# Patient Record
Sex: MALE | Race: WHITE | NOT HISPANIC OR LATINO | Employment: UNEMPLOYED | URBAN - NONMETROPOLITAN AREA
[De-identification: names, ages, dates, MRNs, and addresses within clinical notes are randomized per-mention and may not be internally consistent; named-entity substitution may affect disease eponyms.]

---

## 2017-03-21 ENCOUNTER — HOSPITAL ENCOUNTER (EMERGENCY)
Facility: HOSPITAL | Age: 42
Discharge: PSYCHIATRIC OR SUBSTANCE USE FACILITY | End: 2017-03-21
Attending: EMERGENCY MEDICINE | Admitting: EMERGENCY MEDICINE

## 2017-03-21 ENCOUNTER — HOSPITAL ENCOUNTER (INPATIENT)
Facility: HOSPITAL | Age: 42
LOS: 3 days | Discharge: HOME | End: 2017-03-24

## 2017-03-21 VITALS
OXYGEN SATURATION: 99 % | HEIGHT: 67 IN | HEART RATE: 82 BPM | SYSTOLIC BLOOD PRESSURE: 157 MMHG | BODY MASS INDEX: 29.51 KG/M2 | DIASTOLIC BLOOD PRESSURE: 89 MMHG | TEMPERATURE: 97.9 F | RESPIRATION RATE: 18 BRPM | WEIGHT: 188 LBS

## 2017-03-21 DIAGNOSIS — F11.23 OPIOID DEPENDENCE WITH WITHDRAWAL (HCC): Primary | ICD-10-CM

## 2017-03-21 PROBLEM — F32.9 MDD (MAJOR DEPRESSIVE DISORDER): Status: ACTIVE | Noted: 2017-03-21

## 2017-03-21 LAB
ALBUMIN SERPL-MCNC: 4.4 G/DL (ref 3.5–5)
ALBUMIN/GLOB SERPL: 1.5 G/DL (ref 1.5–2.5)
ALP SERPL-CCNC: 67 U/L (ref 40–129)
ALT SERPL W P-5'-P-CCNC: 17 U/L (ref 10–44)
AMPHET+METHAMPHET UR QL: POSITIVE
ANION GAP SERPL CALCULATED.3IONS-SCNC: 4.1 MMOL/L (ref 3.6–11.2)
AST SERPL-CCNC: 17 U/L (ref 10–34)
BARBITURATES UR QL SCN: NEGATIVE
BASOPHILS # BLD AUTO: 0.02 10*3/MM3 (ref 0–0.3)
BASOPHILS NFR BLD AUTO: 0.4 % (ref 0–2)
BENZODIAZ UR QL SCN: POSITIVE
BILIRUB SERPL-MCNC: 0.4 MG/DL (ref 0.2–1.8)
BILIRUB UR QL STRIP: NEGATIVE
BUN BLD-MCNC: 15 MG/DL (ref 7–21)
BUN/CREAT SERPL: 19 (ref 7–25)
BUPRENORPHINE+NOR UR QL SCN: POSITIVE
CALCIUM SPEC-SCNC: 9.5 MG/DL (ref 7.7–10)
CANNABINOIDS SERPL QL: NEGATIVE
CHLORIDE SERPL-SCNC: 109 MMOL/L (ref 99–112)
CLARITY UR: CLEAR
CO2 SERPL-SCNC: 30.9 MMOL/L (ref 24.3–31.9)
COCAINE UR QL: NEGATIVE
COLOR UR: YELLOW
CREAT BLD-MCNC: 0.79 MG/DL (ref 0.43–1.29)
DEPRECATED RDW RBC AUTO: 42.4 FL (ref 37–54)
EOSINOPHIL # BLD AUTO: 0.25 10*3/MM3 (ref 0–0.7)
EOSINOPHIL NFR BLD AUTO: 5.2 % (ref 0–5)
ERYTHROCYTE [DISTWIDTH] IN BLOOD BY AUTOMATED COUNT: 13.3 % (ref 11.5–14.5)
ETHANOL BLD-MCNC: <10 MG/DL
ETHANOL UR QL: <0.01 %
GFR SERPL CREATININE-BSD FRML MDRD: 107 ML/MIN/1.73
GLOBULIN UR ELPH-MCNC: 2.9 GM/DL
GLUCOSE BLD-MCNC: 98 MG/DL (ref 70–110)
GLUCOSE UR STRIP-MCNC: NEGATIVE MG/DL
HAV IGM SERPL QL IA: ABNORMAL
HBV CORE IGM SERPL QL IA: ABNORMAL
HBV SURFACE AG SERPL QL IA: ABNORMAL
HCT VFR BLD AUTO: 41.6 % (ref 42–52)
HCV AB SER DONR QL: REACTIVE
HGB BLD-MCNC: 13.6 G/DL (ref 14–18)
HGB UR QL STRIP.AUTO: NEGATIVE
HIV1+2 AB SER QL: NORMAL
IMM GRANULOCYTES # BLD: 0.01 10*3/MM3 (ref 0–0.03)
IMM GRANULOCYTES NFR BLD: 0.2 % (ref 0–0.5)
KETONES UR QL STRIP: NEGATIVE
LEUKOCYTE ESTERASE UR QL STRIP.AUTO: NEGATIVE
LYMPHOCYTES # BLD AUTO: 1.58 10*3/MM3 (ref 1–3)
LYMPHOCYTES NFR BLD AUTO: 32.6 % (ref 21–51)
MAGNESIUM SERPL-MCNC: 1.8 MG/DL (ref 1.7–2.6)
MCH RBC QN AUTO: 28.5 PG (ref 27–33)
MCHC RBC AUTO-ENTMCNC: 32.7 G/DL (ref 33–37)
MCV RBC AUTO: 87.2 FL (ref 80–94)
METHADONE UR QL SCN: NEGATIVE
MONOCYTES # BLD AUTO: 0.51 10*3/MM3 (ref 0.1–0.9)
MONOCYTES NFR BLD AUTO: 10.5 % (ref 0–10)
NEUTROPHILS # BLD AUTO: 2.47 10*3/MM3 (ref 1.4–6.5)
NEUTROPHILS NFR BLD AUTO: 51.1 % (ref 30–70)
NITRITE UR QL STRIP: NEGATIVE
OPIATES UR QL: NEGATIVE
OSMOLALITY SERPL CALC.SUM OF ELEC: 287.6 MOSM/KG (ref 273–305)
OXYCODONE UR QL SCN: NEGATIVE
PCP UR QL SCN: NEGATIVE
PH UR STRIP.AUTO: <=5 [PH] (ref 5–8)
PLATELET # BLD AUTO: 169 10*3/MM3 (ref 130–400)
PMV BLD AUTO: 8.8 FL (ref 6–10)
POTASSIUM BLD-SCNC: 4.2 MMOL/L (ref 3.5–5.3)
PROPOXYPH UR QL: NEGATIVE
PROT SERPL-MCNC: 7.3 G/DL (ref 6–8)
PROT UR QL STRIP: NEGATIVE
RBC # BLD AUTO: 4.77 10*6/MM3 (ref 4.7–6.1)
SODIUM BLD-SCNC: 144 MMOL/L (ref 135–153)
SP GR UR STRIP: 1.02 (ref 1–1.03)
UROBILINOGEN UR QL STRIP: NORMAL
WBC NRBC COR # BLD: 4.84 10*3/MM3 (ref 4.5–12.5)

## 2017-03-21 PROCEDURE — 80074 ACUTE HEPATITIS PANEL: CPT

## 2017-03-21 PROCEDURE — HZ2ZZZZ DETOXIFICATION SERVICES FOR SUBSTANCE ABUSE TREATMENT: ICD-10-PCS

## 2017-03-21 PROCEDURE — G0432 EIA HIV-1/HIV-2 SCREEN: HCPCS

## 2017-03-21 RX ORDER — CLONIDINE HYDROCHLORIDE 0.1 MG/1
0.1 TABLET ORAL ONCE AS NEEDED
Status: CANCELLED | OUTPATIENT
Start: 2017-03-25 | End: 2017-03-26

## 2017-03-21 RX ORDER — CLONIDINE HYDROCHLORIDE 0.1 MG/1
0.1 TABLET ORAL 4 TIMES DAILY PRN
Status: ACTIVE | OUTPATIENT
Start: 2017-03-21 | End: 2017-03-22

## 2017-03-21 RX ORDER — MULTIVITAMIN
1 TABLET ORAL DAILY
Status: CANCELLED | OUTPATIENT
Start: 2017-03-21

## 2017-03-21 RX ORDER — BENZTROPINE MESYLATE 1 MG/1
1 TABLET ORAL DAILY PRN
Status: CANCELLED | OUTPATIENT
Start: 2017-03-21

## 2017-03-21 RX ORDER — ONDANSETRON 4 MG/1
4 TABLET, FILM COATED ORAL 3 TIMES DAILY PRN
Status: DISCONTINUED | OUTPATIENT
Start: 2017-03-21 | End: 2017-03-24 | Stop reason: HOSPADM

## 2017-03-21 RX ORDER — MULTIVITAMIN
1 TABLET ORAL DAILY
Status: DISCONTINUED | OUTPATIENT
Start: 2017-03-21 | End: 2017-03-24 | Stop reason: HOSPADM

## 2017-03-21 RX ORDER — BENZONATATE 100 MG/1
100 CAPSULE ORAL 3 TIMES DAILY PRN
Status: CANCELLED | OUTPATIENT
Start: 2017-03-21

## 2017-03-21 RX ORDER — CLONIDINE HYDROCHLORIDE 0.1 MG/1
0.1 TABLET ORAL ONCE AS NEEDED
Status: DISCONTINUED | OUTPATIENT
Start: 2017-03-25 | End: 2017-03-24 | Stop reason: HOSPADM

## 2017-03-21 RX ORDER — CLONIDINE HYDROCHLORIDE 0.1 MG/1
0.1 TABLET ORAL 4 TIMES DAILY PRN
Status: ACTIVE | OUTPATIENT
Start: 2017-03-22 | End: 2017-03-23

## 2017-03-21 RX ORDER — DICYCLOMINE HYDROCHLORIDE 10 MG/1
10 CAPSULE ORAL 3 TIMES DAILY PRN
Status: CANCELLED | OUTPATIENT
Start: 2017-03-21 | End: 2017-03-26

## 2017-03-21 RX ORDER — ECHINACEA PURPUREA EXTRACT 125 MG
2 TABLET ORAL AS NEEDED
Status: CANCELLED | OUTPATIENT
Start: 2017-03-21

## 2017-03-21 RX ORDER — NICOTINE 21 MG/24HR
1 PATCH, TRANSDERMAL 24 HOURS TRANSDERMAL EVERY 24 HOURS
Status: CANCELLED | OUTPATIENT
Start: 2017-03-21

## 2017-03-21 RX ORDER — ONDANSETRON 4 MG/1
4 TABLET, FILM COATED ORAL 3 TIMES DAILY PRN
Status: CANCELLED | OUTPATIENT
Start: 2017-03-21 | End: 2017-03-26

## 2017-03-21 RX ORDER — TRAZODONE HYDROCHLORIDE 50 MG/1
50 TABLET ORAL NIGHTLY PRN
Status: CANCELLED | OUTPATIENT
Start: 2017-03-21

## 2017-03-21 RX ORDER — CYCLOBENZAPRINE HCL 10 MG
10 TABLET ORAL 3 TIMES DAILY PRN
Status: CANCELLED | OUTPATIENT
Start: 2017-03-21 | End: 2017-03-26

## 2017-03-21 RX ORDER — CLONIDINE HYDROCHLORIDE 0.1 MG/1
0.1 TABLET ORAL 2 TIMES DAILY PRN
Status: CANCELLED | OUTPATIENT
Start: 2017-03-24 | End: 2017-03-25

## 2017-03-21 RX ORDER — PANTOPRAZOLE SODIUM 40 MG/1
40 TABLET, DELAYED RELEASE ORAL
Status: DISCONTINUED | OUTPATIENT
Start: 2017-03-22 | End: 2017-03-24 | Stop reason: HOSPADM

## 2017-03-21 RX ORDER — OMEPRAZOLE 40 MG/1
40 CAPSULE, DELAYED RELEASE ORAL DAILY
Status: ON HOLD | COMMUNITY
End: 2017-03-24

## 2017-03-21 RX ORDER — MAGNESIUM HYDROXIDE/ALUMINUM HYDROXICE/SIMETHICONE 120; 1200; 1200 MG/30ML; MG/30ML; MG/30ML
30 SUSPENSION ORAL EVERY 6 HOURS PRN
Status: CANCELLED | OUTPATIENT
Start: 2017-03-21

## 2017-03-21 RX ORDER — HYDROXYZINE 50 MG/1
50 TABLET, FILM COATED ORAL EVERY 6 HOURS PRN
Status: DISCONTINUED | OUTPATIENT
Start: 2017-03-21 | End: 2017-03-24 | Stop reason: HOSPADM

## 2017-03-21 RX ORDER — ALUMINA, MAGNESIA, AND SIMETHICONE 2400; 2400; 240 MG/30ML; MG/30ML; MG/30ML
15 SUSPENSION ORAL EVERY 6 HOURS PRN
Status: DISCONTINUED | OUTPATIENT
Start: 2017-03-21 | End: 2017-03-24 | Stop reason: HOSPADM

## 2017-03-21 RX ORDER — CLONIDINE HYDROCHLORIDE 0.1 MG/1
0.1 TABLET ORAL 3 TIMES DAILY PRN
Status: CANCELLED | OUTPATIENT
Start: 2017-03-23 | End: 2017-03-24

## 2017-03-21 RX ORDER — GABAPENTIN 300 MG/1
600 CAPSULE ORAL EVERY 8 HOURS SCHEDULED
Status: CANCELLED | OUTPATIENT
Start: 2017-03-21

## 2017-03-21 RX ORDER — FAMOTIDINE 20 MG/1
20 TABLET, FILM COATED ORAL 2 TIMES DAILY PRN
Status: CANCELLED | OUTPATIENT
Start: 2017-03-21

## 2017-03-21 RX ORDER — TRAZODONE HYDROCHLORIDE 50 MG/1
50 TABLET ORAL NIGHTLY PRN
Status: DISCONTINUED | OUTPATIENT
Start: 2017-03-21 | End: 2017-03-24 | Stop reason: HOSPADM

## 2017-03-21 RX ORDER — LOPERAMIDE HYDROCHLORIDE 2 MG/1
2 CAPSULE ORAL 4 TIMES DAILY PRN
Status: DISCONTINUED | OUTPATIENT
Start: 2017-03-21 | End: 2017-03-24 | Stop reason: HOSPADM

## 2017-03-21 RX ORDER — BENZTROPINE MESYLATE 1 MG/1
1 TABLET ORAL DAILY PRN
Status: DISCONTINUED | OUTPATIENT
Start: 2017-03-21 | End: 2017-03-24 | Stop reason: HOSPADM

## 2017-03-21 RX ORDER — CLONIDINE HYDROCHLORIDE 0.1 MG/1
0.1 TABLET ORAL 2 TIMES DAILY PRN
Status: DISCONTINUED | OUTPATIENT
Start: 2017-03-24 | End: 2017-03-24 | Stop reason: HOSPADM

## 2017-03-21 RX ORDER — ECHINACEA PURPUREA EXTRACT 125 MG
2 TABLET ORAL AS NEEDED
Status: DISCONTINUED | OUTPATIENT
Start: 2017-03-21 | End: 2017-03-24 | Stop reason: HOSPADM

## 2017-03-21 RX ORDER — ACETAMINOPHEN 325 MG/1
650 TABLET ORAL EVERY 4 HOURS PRN
Status: DISCONTINUED | OUTPATIENT
Start: 2017-03-21 | End: 2017-03-24 | Stop reason: HOSPADM

## 2017-03-21 RX ORDER — CLONIDINE HYDROCHLORIDE 0.1 MG/1
0.1 TABLET ORAL 4 TIMES DAILY PRN
Status: CANCELLED | OUTPATIENT
Start: 2017-03-21

## 2017-03-21 RX ORDER — FAMOTIDINE 20 MG/1
20 TABLET, FILM COATED ORAL 2 TIMES DAILY PRN
Status: DISCONTINUED | OUTPATIENT
Start: 2017-03-21 | End: 2017-03-24 | Stop reason: HOSPADM

## 2017-03-21 RX ORDER — BENZONATATE 100 MG/1
100 CAPSULE ORAL 3 TIMES DAILY PRN
Status: DISCONTINUED | OUTPATIENT
Start: 2017-03-21 | End: 2017-03-24 | Stop reason: HOSPADM

## 2017-03-21 RX ORDER — BENZTROPINE MESYLATE 1 MG/ML
0.5 INJECTION INTRAMUSCULAR; INTRAVENOUS DAILY PRN
Status: CANCELLED | OUTPATIENT
Start: 2017-03-21

## 2017-03-21 RX ORDER — CLONIDINE HYDROCHLORIDE 0.1 MG/1
0.1 TABLET ORAL 3 TIMES DAILY PRN
Status: ACTIVE | OUTPATIENT
Start: 2017-03-23 | End: 2017-03-24

## 2017-03-21 RX ORDER — NICOTINE 21 MG/24HR
1 PATCH, TRANSDERMAL 24 HOURS TRANSDERMAL DAILY
Status: DISCONTINUED | OUTPATIENT
Start: 2017-03-21 | End: 2017-03-24 | Stop reason: HOSPADM

## 2017-03-21 RX ORDER — LOPERAMIDE HYDROCHLORIDE 2 MG/1
2 CAPSULE ORAL 4 TIMES DAILY PRN
Status: CANCELLED | OUTPATIENT
Start: 2017-03-21

## 2017-03-21 RX ORDER — CLONIDINE HYDROCHLORIDE 0.1 MG/1
0.1 TABLET ORAL 4 TIMES DAILY PRN
Status: CANCELLED | OUTPATIENT
Start: 2017-03-22 | End: 2017-03-23

## 2017-03-21 RX ORDER — BENZTROPINE MESYLATE 1 MG/ML
0.5 INJECTION INTRAMUSCULAR; INTRAVENOUS DAILY PRN
Status: DISCONTINUED | OUTPATIENT
Start: 2017-03-21 | End: 2017-03-24 | Stop reason: HOSPADM

## 2017-03-21 RX ORDER — DICYCLOMINE HYDROCHLORIDE 10 MG/1
10 CAPSULE ORAL 3 TIMES DAILY PRN
Status: DISCONTINUED | OUTPATIENT
Start: 2017-03-21 | End: 2017-03-24 | Stop reason: HOSPADM

## 2017-03-21 RX ORDER — PROPRANOLOL HYDROCHLORIDE 80 MG/1
80 CAPSULE, EXTENDED RELEASE ORAL DAILY
Status: ON HOLD | COMMUNITY
End: 2017-03-24

## 2017-03-21 RX ORDER — GABAPENTIN 600 MG/1
600 TABLET ORAL 3 TIMES DAILY
Status: ON HOLD | COMMUNITY
End: 2017-03-24

## 2017-03-21 RX ORDER — PROPRANOLOL HYDROCHLORIDE 80 MG/1
80 CAPSULE, EXTENDED RELEASE ORAL DAILY
Status: DISCONTINUED | OUTPATIENT
Start: 2017-03-21 | End: 2017-03-24 | Stop reason: HOSPADM

## 2017-03-21 RX ORDER — HYDROXYZINE HYDROCHLORIDE 25 MG/1
50 TABLET, FILM COATED ORAL EVERY 6 HOURS PRN
Status: CANCELLED | OUTPATIENT
Start: 2017-03-21

## 2017-03-21 RX ORDER — ACETAMINOPHEN 325 MG/1
650 TABLET ORAL EVERY 4 HOURS PRN
Status: CANCELLED | OUTPATIENT
Start: 2017-03-21

## 2017-03-21 RX ORDER — CYCLOBENZAPRINE HCL 10 MG
10 TABLET ORAL 3 TIMES DAILY PRN
Status: DISCONTINUED | OUTPATIENT
Start: 2017-03-21 | End: 2017-03-24 | Stop reason: HOSPADM

## 2017-03-21 RX ADMIN — Medication 1 TABLET: at 13:39

## 2017-03-21 RX ADMIN — NICOTINE 1 PATCH: 21 PATCH TRANSDERMAL at 13:39

## 2017-03-21 RX ADMIN — PROPRANOLOL HYDROCHLORIDE 80 MG: 80 CAPSULE, EXTENDED RELEASE ORAL at 17:00

## 2017-03-21 NOTE — NURSING NOTE
Patient pockets emptied. Thorough search complete. Undergarments searched by intake nurse and was witnessed by Mello in security Per ED policy 100. The patient was placed in hospital attire. Belongings were logged on personal belongings sheet. Room was swept for any potential safety hazards room cleared and patient placed in treatment room

## 2017-03-21 NOTE — PLAN OF CARE
Problem: BH Patient Care Overview (Adult)  Goal: Plan of Care Review  Outcome: Ongoing (interventions implemented as appropriate)    03/21/17 1841   Coping/Psychosocial Response Interventions   Plan Of Care Reviewed With patient   Coping/Psychosocial   Patient Agreement with Plan of Care agrees   Outcome Evaluation   Outcome Summary/Follow up Plan New admit see nurse note.         Problem:  Overarching Goals  Goal: Adheres to Safety Considerations for Self and Others  Outcome: Ongoing (interventions implemented as appropriate)  Goal: Optimized Coping Skills in Response to Life Stressors  Outcome: Ongoing (interventions implemented as appropriate)  Goal: Develops/Participates in Therapeutic [place] to Support Successful Transition  Outcome: Ongoing (interventions implemented as appropriate)

## 2017-03-22 PROBLEM — F13.10 SEDATIVE, HYPNOTIC OR ANXIOLYTIC ABUSE, EPISODIC (HCC): Status: ACTIVE | Noted: 2017-03-22

## 2017-03-22 PROBLEM — F33.2 MAJOR DEPRESSIVE DISORDER, RECURRENT, SEVERE WITHOUT PSYCHOTIC FEATURES (HCC): Status: ACTIVE | Noted: 2017-03-21

## 2017-03-22 PROBLEM — F43.23 ADJUSTMENT DISORDER WITH MIXED ANXIETY AND DEPRESSED MOOD: Status: ACTIVE | Noted: 2017-03-22

## 2017-03-22 PROBLEM — F11.20 OPIOID TYPE DEPENDENCE, CONTINUOUS (HCC): Status: ACTIVE | Noted: 2017-03-22

## 2017-03-22 PROBLEM — F10.10 ALCOHOL ABUSE, EPISODIC: Status: ACTIVE | Noted: 2017-03-22

## 2017-03-22 PROBLEM — F15.10 METHAMPHETAMINE ABUSE (HCC): Status: ACTIVE | Noted: 2017-03-22

## 2017-03-22 PROCEDURE — 99223 1ST HOSP IP/OBS HIGH 75: CPT

## 2017-03-22 RX ORDER — PAROXETINE HYDROCHLORIDE 20 MG/1
20 TABLET, FILM COATED ORAL DAILY
Status: DISCONTINUED | OUTPATIENT
Start: 2017-03-22 | End: 2017-03-24 | Stop reason: HOSPADM

## 2017-03-22 RX ORDER — GABAPENTIN 300 MG/1
600 CAPSULE ORAL 3 TIMES DAILY
Status: DISCONTINUED | OUTPATIENT
Start: 2017-03-22 | End: 2017-03-24 | Stop reason: HOSPADM

## 2017-03-22 RX ADMIN — PANTOPRAZOLE SODIUM 40 MG: 40 TABLET, DELAYED RELEASE ORAL at 05:29

## 2017-03-22 RX ADMIN — ACETAMINOPHEN 650 MG: 325 TABLET ORAL at 17:48

## 2017-03-22 RX ADMIN — TRAZODONE HYDROCHLORIDE 50 MG: 50 TABLET ORAL at 23:53

## 2017-03-22 RX ADMIN — Medication 1 TABLET: at 09:18

## 2017-03-22 RX ADMIN — PAROXETINE 20 MG: 20 TABLET, FILM COATED ORAL at 18:08

## 2017-03-22 RX ADMIN — PROPRANOLOL HYDROCHLORIDE 80 MG: 80 CAPSULE, EXTENDED RELEASE ORAL at 09:16

## 2017-03-22 RX ADMIN — ONDANSETRON 4 MG: 4 TABLET, FILM COATED ORAL at 04:32

## 2017-03-22 RX ADMIN — NICOTINE 1 PATCH: 21 PATCH TRANSDERMAL at 09:20

## 2017-03-22 RX ADMIN — GABAPENTIN 600 MG: 300 CAPSULE ORAL at 18:08
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
## 2017-03-22 NOTE — PLAN OF CARE
Problem:  Patient Care Overview (Adult)  Goal: Plan of Care Review  Outcome: Ongoing (interventions implemented as appropriate)    03/22/17 0401   Coping/Psychosocial Response Interventions   Plan Of Care Reviewed With patient   Coping/Psychosocial   Patient Agreement with Plan of Care agrees   Outcome Evaluation   Outcome Summary/Follow up Plan Pt cooperative this shift. Isolates in room. Rates anxiety 3/10 and depression 10/10. Denies SI/HI and KAMALA. Pt denies cravings and reports minimal w/d symptoms.    Patient Care Overview   Progress no change

## 2017-03-22 NOTE — PLAN OF CARE
Problem: BH Patient Care Overview (Adult)  Goal: Plan of Care Review  Outcome: Ongoing (interventions implemented as appropriate)    03/22/17 3756   Coping/Psychosocial Response Interventions   Plan Of Care Reviewed With patient   Coping/Psychosocial   Patient Agreement with Plan of Care agrees   Outcome Evaluation   Outcome Summary/Follow up Plan Pt reports feeling better, rates craving 6, denies SI, does c/o nausea and anxiety.   Patient Care Overview   Progress improving

## 2017-03-23 PROCEDURE — 99232 SBSQ HOSP IP/OBS MODERATE 35: CPT

## 2017-03-23 RX ADMIN — PROPRANOLOL HYDROCHLORIDE 80 MG: 80 CAPSULE, EXTENDED RELEASE ORAL at 08:34

## 2017-03-23 RX ADMIN — Medication 1 TABLET: at 08:34

## 2017-03-23 RX ADMIN — GABAPENTIN 600 MG: 300 CAPSULE ORAL at 08:34

## 2017-03-23 RX ADMIN — TRAZODONE HYDROCHLORIDE 50 MG: 50 TABLET ORAL at 20:02

## 2017-03-23 RX ADMIN — NICOTINE 1 PATCH: 21 PATCH TRANSDERMAL at 08:35

## 2017-03-23 RX ADMIN — PAROXETINE 20 MG: 20 TABLET, FILM COATED ORAL at 08:34

## 2017-03-23 RX ADMIN — GABAPENTIN 600 MG: 300 CAPSULE ORAL at 20:03

## 2017-03-23 RX ADMIN — PANTOPRAZOLE SODIUM 40 MG: 40 TABLET, DELAYED RELEASE ORAL at 05:34

## 2017-03-23 RX ADMIN — CYCLOBENZAPRINE HYDROCHLORIDE 10 MG: 10 TABLET, FILM COATED ORAL at 10:05

## 2017-03-23 RX ADMIN — ACETAMINOPHEN 650 MG: 325 TABLET ORAL at 20:02

## 2017-03-23 RX ADMIN — GABAPENTIN 600 MG: 300 CAPSULE ORAL at 16:07

## 2017-03-23 NOTE — PLAN OF CARE
"Problem:  Patient Care Overview (Adult)  Goal: Plan of Care Review  Outcome: Ongoing (interventions implemented as appropriate)    03/23/17 8557   Coping/Psychosocial Response Interventions   Plan Of Care Reviewed With patient   Coping/Psychosocial   Patient Agreement with Plan of Care agrees   Outcome Evaluation   Outcome Summary/Follow up Plan Patient denies any thoughts to harm self, reports depression at 7/10 with no anxiety to report. Patient reports good sleep but \"Weird dreams\" and appetite is fair. Reports craving at 4 with no withdrawal symptom noted. Patient reports being ready for discharge in the AM   Patient Care Overview   Progress improving       Goal: Interdisciplinary Rounds/Family Conference  Outcome: Ongoing (interventions implemented as appropriate)  Goal: Individualization and Mutuality  Outcome: Ongoing (interventions implemented as appropriate)  Goal: Discharge Needs Assessment  Outcome: Ongoing (interventions implemented as appropriate)    Problem:  Overarching Goals  Goal: Adheres to Safety Considerations for Self and Others  Outcome: Ongoing (interventions implemented as appropriate)  Goal: Optimized Coping Skills in Response to Life Stressors  Outcome: Ongoing (interventions implemented as appropriate)  Goal: Develops/Participates in Therapeutic [place] to Support Successful Transition  Outcome: Ongoing (interventions implemented as appropriate)      "

## 2017-03-23 NOTE — PLAN OF CARE
Problem: BH Patient Care Overview (Adult)  Goal: Plan of Care Review  Outcome: Ongoing (interventions implemented as appropriate)    03/23/17 0602   Coping/Psychosocial Response Interventions   Plan Of Care Reviewed With patient   Coping/Psychosocial   Patient Agreement with Plan of Care agrees   Outcome Evaluation   Outcome Summary/Follow up Plan Pt. is stable and slept all night. He denies wd symptoms and denies craving. Denies SI and HI.    Patient Care Overview   Progress improving

## 2017-03-23 NOTE — PLAN OF CARE
Problem:  Patient Care Overview (Adult)  Goal: Discharge Needs Assessment    03/23/17 1118   Discharge Needs Assessment   Discharge Planning Comments The patient is now reporting that his aunt is supportive of him seeking further treatment and will provide transportation.    Living Environment   Transportation Available family or friend will provide   Referrals have been sent to [facility], [facility], and [facility].  We will be checking with these facilities today and if no beds available, we will send out additional referrals.
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
## 2017-03-23 NOTE — PROGRESS NOTES
Navigator is helping primary therapist with discharge planning. Navigator faxed the following referrals on this day.     [facility]  [phone]     [facility]  [phone]     [facility]  [phone]    [facility]  [phone]

## 2017-03-23 NOTE — PROGRESS NOTES
Subjective   Jc Andres Jr. is a 42 y.o. male     Hospital Day #2    Chief Complaint:  Depression    HPI:  History of Present Illness  He tolerated starting Paxil yesterday.  Still having some withdrawal symptoms but feeling a little bit better.  He feels like he could leave the hospital safely tomorrow, he is hoping to go directly to rehabilitation.    Anxiety rating 5/10  Depression rating 5/10  Sleep: ok  Withdrawal sx: see ROS  Craving: Denies    Review of Systems   Constitutional: Positive for fatigue.   Respiratory: Negative for shortness of breath.    Cardiovascular: Negative for chest pain.   Gastrointestinal: Negative for nausea.   Musculoskeletal: Positive for myalgias. Negative for neck stiffness.   Neurological: Negative for tremors.   All other systems reviewed and are negative.      Objective   Physical Exam   Constitutional: He appears well-developed and well-nourished. No distress.   Neurological: He is alert. Coordination and gait normal.   Vitals reviewed.      Wt Readings from Last 3 Encounters:   03/21/17 183 lb (83 kg)   03/21/17 188 lb (85.3 kg)     Temp Readings from Last 3 Encounters:   03/23/17 98.6 °F (37 °C) (Temporal Artery )   03/21/17 97.9 °F (36.6 °C) (Oral)     BP Readings from Last 3 Encounters:   03/23/17 148/80   03/21/17 157/89     Pulse Readings from Last 3 Encounters:   03/23/17 98   03/21/17 82       Allergies   Allergen Reactions   • No Known Drug Allergy        Lab Results (last 24 hours)     ** No results found for the last 24 hours. **          Imaging Results (last 24 hours)     ** No results found for the last 24 hours. **          Current Medications:     gabapentin 600 mg Oral TID   multivitamin 1 tablet Oral Daily   nicotine 1 patch Transdermal Daily   pantoprazole 40 mg Oral Q AM   PARoxetine 20 mg Oral Daily   propranolol LA 80 mg Oral Daily     •  acetaminophen  •  aluminum-magnesium hydroxide-simethicone  •  benzonatate  •  benztropine **OR** benztropine  •   "[] CloNIDine **FOLLOWED BY** CloNIDine **FOLLOWED BY** [START ON 3/24/2017] CloNIDine **FOLLOWED BY** [START ON 3/25/2017] CloNIDine  •  cyclobenzaprine  •  dicyclomine  •  famotidine  •  hydrOXYzine  •  loperamide  •  magnesium hydroxide  •  ondansetron  •  pneumococcal polysaccharide 23-valent  •  sodium chloride  •  traZODone      Mental Status Exam:   Appearance: Neatly, casually dressed, good hygiene.   Cooperation: Cooperative  Orientation: Ox4  Gait and station stable.   Psychomotor: No psychomotor agitation/retardation, No EPS, No motor tics  Speech: normal rate, amount.  Language: intact  Fund of Knowledge: average  Mood \"still depressed\"   Affect: congruent/appropriate.  Associations: intact  Thought Content: goal directed, no delusional material present  Thought process: linear, organized.  Suicidality: Denies SI  Homicidality: Denies HI  Perception: Denies AH/VH  Memory is intact for recent and remote events  Attention/Concentration: good  Impulse control: good  Insight: fair   Judgement: fair    Special Precaution Level: Continue current level of special precautions    Assessment/Plan:   Assessment/Plan   Patient Active Problem List   Diagnosis Code   • Major depressive disorder, recurrent, severe without psychotic features F33.2   • Opioid type dependence, continuous F11.20   • Alcohol abuse, episodic F10.10   • Methamphetamine abuse F15.10   • Sedative, hypnotic or anxiolytic abuse, 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  · Continue clonidine detox protocol for symptomatic management of opioid withdrawal.  · Will tentatively plan for discharge tomorrow.  Hopefully he will be able to go directly to rehabilitation.    We discussed risks, benefits, and side effects of the above medication and the patient was agreeable with the plan. I have reviewed the treatment plan and agree with current " plan.  Treatment was discussed with the patient who is agreeable to this treatment and plan.

## 2017-03-24 VITALS
DIASTOLIC BLOOD PRESSURE: 86 MMHG | WEIGHT: 183 LBS | TEMPERATURE: 99 F | OXYGEN SATURATION: 97 % | SYSTOLIC BLOOD PRESSURE: 138 MMHG | BODY MASS INDEX: 28.72 KG/M2 | HEART RATE: 61 BPM | HEIGHT: 67 IN | RESPIRATION RATE: 20 BRPM

## 2017-03-24 PROCEDURE — 99238 HOSP IP/OBS DSCHRG MGMT 30/<: CPT

## 2017-03-24 RX ORDER — OMEPRAZOLE 40 MG/1
40 CAPSULE, DELAYED RELEASE ORAL DAILY
Qty: 30 CAPSULE | Refills: 0 | Status: SHIPPED | OUTPATIENT
Start: 2017-03-24

## 2017-03-24 RX ORDER — TRAZODONE HYDROCHLORIDE 50 MG/1
50 TABLET ORAL NIGHTLY PRN
Qty: 30 TABLET | Refills: 0 | Status: SHIPPED | OUTPATIENT
Start: 2017-03-24

## 2017-03-24 RX ORDER — PROPRANOLOL HYDROCHLORIDE 80 MG/1
80 CAPSULE, EXTENDED RELEASE ORAL DAILY
Qty: 30 CAPSULE | Refills: 0 | Status: SHIPPED | OUTPATIENT
Start: 2017-03-24

## 2017-03-24 RX ORDER — GABAPENTIN 600 MG/1
600 TABLET ORAL 3 TIMES DAILY
Qty: 90 TABLET | Refills: 0 | Status: SHIPPED | OUTPATIENT
Start: 2017-03-24

## 2017-03-24 RX ORDER — PAROXETINE HYDROCHLORIDE 20 MG/1
20 TABLET, FILM COATED ORAL DAILY
Qty: 30 TABLET | Refills: 0 | Status: SHIPPED | OUTPATIENT
Start: 2017-03-24

## 2017-03-24 RX ADMIN — GABAPENTIN 600 MG: 300 CAPSULE ORAL at 08:07

## 2017-03-24 RX ADMIN — Medication 1 TABLET: at 08:07

## 2017-03-24 RX ADMIN — PROPRANOLOL HYDROCHLORIDE 80 MG: 80 CAPSULE, EXTENDED RELEASE ORAL at 08:07

## 2017-03-24 RX ADMIN — NICOTINE 1 PATCH: 21 PATCH TRANSDERMAL at 08:07

## 2017-03-24 RX ADMIN — PAROXETINE 20 MG: 20 TABLET, FILM COATED ORAL at 08:07

## 2017-03-24 RX ADMIN — PANTOPRAZOLE SODIUM 40 MG: 40 TABLET, DELAYED RELEASE ORAL at 05:31

## 2017-03-24 NOTE — PROGRESS NOTES
Bridge Session  Date:  3/24/17  Time: 2189-5354    Data:  Reason for Inpatient Admission: Depression with S.I and substance use disorder.    [facility] up: [facility] in [place], [place] on 3/24/17 at 4pm.     Coping Skills to Utilize: further treatment to develop coping skills to manage craving, music, tv, talk to someone, and outdoor activities.     Crisis Safety Plan:  • Support System to utilize and contact numbers: aunt, rehab    • Educated on crisis hotline numbers (yes/no): yes    • Was the Patient made aware of contact information for the following: community mental health centers, crisis stabilization programs, residential programs, , etc (yes/no): yes    • Will transportation be a barrier (yes/no): no    • If so, explain solution(s) to resolve barrier: Aunt is providing transportation.    • How and where will the patient obtain prescribed medications: [facility] with insurance.  [facility] program for medications that could not be filled with his medicaid.     Assessment: The patient is denying any thoughts to harm self or others.  He agrees with the discharge plan and will enter residential treatment on this date.     Plan:    Discussed the importance of follow up treatment for continuity of care. The Patient was able to verbalize understanding and commitment to the individualized aftercare and crisis safety plan.

## 2017-03-24 NOTE — DISCHARGE SUMMARY
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 male hospitalized for complaints of increased depression, suicidal ideations, polysubstance abuse. Patient presented to [facility] reporting he ws experiencing \"thoughts to go to sleep and not wake up\". Stating he had \"racing thoughts \" would \" figure out a plan\". Patient also requested assistance with detoxification, polysubstance reporting he had relapsed Reported using 1/2 strip of Suboxone daily, last use day prior to admission -he has been using Suboxone for several years but has been tapering himself down over the past few months down to about 2 mg daily now ; Benzodiazepine using only sporadically. Also reported he had recently used Meth IV after not using for several years. Reports he drank 2 fifths of liquor over the last couple of days prior to admit, last use day prior to admit, however he says he has only drank approximately 5 times over the last year. Noted COW of 10 and CIWA of 16 during intake assessment. Patient noted to be tearful during initial interview reporting recent symptoms of low mood, anxiety, irritability. Shares that on Thursday He caught his wife of 8 years was sleeping with another man. One day later shares that his Father suffered a massive heart attack. Reports he's experienced overwhelming feelings of helplessness. Shares that he recently relapsed on drugs and alcohol after a period of several months of sobriety (except " Suboxone which she has been using consistently, but has tapered himself down to approximately 2 mg daily) Sobriety. Patient reports history of drug use. He's been treated inpatient for detoxification and also has history of attending suboxone clinic.  He tolerated clonidine detox without major event. Tolerated restarting paxil which had been helpful in the past. Mood improved over the course of his short hospital stay. He was able to secure a spot in rehab center. By day of dc he was denying any w/d sx, more future oriented and was agreeable to going directly to rehab center.      Procedures Performed       None    Consults:   Consults     No orders found from 2/20/2017 to 3/22/2017.          Pertinent Test Results:   Lab Results (last 7 days)     Procedure Component Value Units Date/Time    HIV-1 & HIV-2 Antibodies [[id number]] Collected:  03/21/17 1524    Specimen:  Blood Updated:  03/21/17 1658    Narrative:       The following orders were created for panel order HIV-1 & HIV-2 Antibodies.  Procedure                               Abnormality         Status                     ---------                               -----------         ------                     HIV-1 / O / 2 Ag / Antibo...[[id number]]  Normal              Final result                 Please view results for these tests on the individual orders.    HIV-1 / O / 2 Ag / Antibody 4th Generation [[id number]]  (Normal) Collected:  03/21/17 1524    Specimen:  Blood Updated:  03/21/17 1658     HIV-1/ HIV-2 Non-Reactive    Narrative:         A non-reactive test result does not preclude the possibility of exposure to HIV or infection with HIV. An antibody response to recent exposure may take several months to reach detectable levels.    Hepatitis Panel, Acute [[id number]]  (Abnormal) Collected:  03/21/17 1524    Specimen:  Blood Updated:  03/21/17 1712     Hepatitis B Surface Ag Non-Reactive     Hep A IgM Non-Reactive     Hep B C IgM Non-Reactive     Hepatitis C Ab  "Reactive (A)              Mental Status Exam at Discharge:  Appearance:Neatly, casually dressed, good hygeine.   Cooperation:Cooperative  Orientation: Ox4  Gait and station stable.   Psychomotor: No psychomotor agitation/retardation, No EPS, No motor tics  Speech: normal rate, amount.  Language: intact  Fund of Knowledge: average  Mood \"better\"   Affect-congruent/appropriate.  Associations: intact  Thought Content-goal directed, no delusional material present  Thought process-linear, organized.  Suicidality: No SI  Homicidality: No HI  Perception: No AH/VH  Memory is intact for recent and remote events  Attention/Concentration: good  Impulse control-good  Insight-fair   Judgement-fair    Condition on Discharge:  stable    Vital Signs  Temp:  [97 °F (36.1 °C)-98.2 °F (36.8 °C)] 98.2 °F (36.8 °C)  Heart Rate:  [57-71] 71  Resp:  [18] 18  BP: (122-143)/(68-91) 143/91    Discharge Disposition  Home or Self Care    Discharge Medications   Jc Andres Jr.   Home Medication Instructions TIAN:[id number]    Printed on:03/24/17 0894   Medication Information                      gabapentin (NEURONTIN) 600 MG tablet  Take 1 tablet by mouth 3 (Three) Times a Day.             omeprazole (priLOSEC) 40 MG capsule  Take 1 capsule by mouth Daily.             PARoxetine (PAXIL) 20 MG tablet  Take 1 tablet by mouth Daily.             propranolol LA (INDERAL LA) 80 MG 24 hr capsule  Take 1 capsule by mouth Daily.             traZODone (DESYREL) 50 MG tablet  Take 1 tablet by mouth At Night As Needed for Sleep.                 Discharge Diet:   Diet Instructions     Advance Diet As Tolerated                     Activity at Discharge:   Activity Instructions     Activity as Tolerated                     Follow-up Appointments  [facility] [place] 3/24/17 4pm      Test Results Pending at Discharge    None     Zach Adler MD  03/24/17  8:58 AM      "

## 2017-03-24 NOTE — SIGNIFICANT NOTE
03/24/17 0857   [place] Suicide Severity Rating Scale (Discharge)   1. Wish to be Dead Yes   2. Suicidal Thoughts Yes   3. Suicidal Thoughts with Method Without Specific Plan or Intent to Act No   4. Suicidal Intent Without Specific Plan No   5. Suicide Intent with Specific Plan Yes   6. Suicide Behavior Question No   By the day of discharge, the patient was denying any suicidal ideations.

## 2017-03-24 NOTE — PLAN OF CARE
Problem:  Patient Care Overview (Adult)  Goal: Plan of Care Review  Outcome: Ongoing (interventions implemented as appropriate)    03/24/17 0413   Coping/Psychosocial Response Interventions   Plan Of Care Reviewed With patient   Coping/Psychosocial   Patient Agreement with Plan of Care agrees   Outcome Evaluation   Outcome Summary/Follow up Plan Pt cooperative this shift. Isolates in room. Rates anxiety 4/10 and depression 5/10. Denies SI/HI and KAMALA. Pt denies cravings and denies w/d symptoms.    Patient Care Overview   Progress improving

## 2017-03-24 NOTE — DISCHARGE INSTR - APPOINTMENTS
[facility] [place]  [address]  [place] [address]  [phone]    Intake: 3/24/17 at 4:00pm

## 2017-03-26 NOTE — ED PROVIDER NOTES
Subjective   Patient is a 42 y.o. male presenting with drug/alcohol assessment.   History provided by:  Patient  Drug / Alcohol Assessment   Primary symptoms include weakness. Suspected agents include alcohol, methamphetamines and opiates. Pertinent negatives include no fever. Associated medical issues include addiction treatment.       Review of Systems   Constitutional: Negative for fever.   HENT: Negative.    Respiratory: Negative.    Cardiovascular: Negative.  Negative for chest pain.   Gastrointestinal: Negative.  Negative for abdominal pain.   Endocrine: Negative.    Genitourinary: Negative.  Negative for dysuria.   Skin: Negative.    Neurological: Positive for weakness.   Psychiatric/Behavioral: Negative.    All other systems reviewed and are negative.      Past Medical History:   Diagnosis Date   • Anxiety    • Bipolar disorder    • Depression    • HTN (hypertension)    • Liver disease     hep c   • Psychiatric illness    • PTSD (post-traumatic stress disorder)    • Seizures     6 years ago coming off etoh   • Substance abuse    • Withdrawal symptoms, drug or narcotic        Allergies   Allergen Reactions   • No Known Drug Allergy        Past Surgical History:   Procedure Laterality Date   • INGUINAL HERNIA REPAIR         Family History   Problem Relation Age of Onset   • Anxiety disorder Mother    • Bipolar disorder Mother    • Dementia Mother    • Depression Mother    • Alcohol abuse Father    • Depression Sister    • ADD / ADHD Neg Hx    • Drug abuse Neg Hx    • OCD Neg Hx    • Paranoid behavior Neg Hx    • Schizophrenia Neg Hx    • Seizures Neg Hx    • Self-Injurious Behavior  Neg Hx    • Suicide Attempts Neg Hx        Social History     Social History   • Marital status: Legally      Spouse name: N/A   • Number of children: N/A   • Years of education: N/A     Social History Main Topics   • Smoking status: Current Every Day Smoker     Packs/day: 1.00     Years: 20.00     Types: Cigarettes      Start date: 3/21/1997   • Smokeless tobacco: None   • Alcohol use None      Comment: drank a couple 1/5ths over weekend and slowly tapered myself off yesterday; pt does not normally do this.    • Drug use: Yes     Special: Methamphetamines      Comment: suboxone   • Sexual activity: Defer     Other Topics Concern   • None     Social History Narrative           Objective   Physical Exam   Constitutional: He is oriented to person, place, and time. He appears well-developed and well-nourished. No distress.   HENT:   Head: Normocephalic and atraumatic.   Right Ear: External ear normal.   Left Ear: External ear normal.   Nose: Nose normal.   Eyes: Conjunctivae and EOM are normal. Pupils are equal, round, and reactive to light.   Neck: Normal range of motion. Neck supple. No JVD present. No tracheal deviation present.   Cardiovascular: Normal rate, regular rhythm and normal heart sounds.    No murmur heard.  Pulmonary/Chest: Effort normal and breath sounds normal. No respiratory distress. He has no wheezes.   Abdominal: Soft. Bowel sounds are normal. There is no tenderness.   Musculoskeletal: Normal range of motion. He exhibits no edema or deformity.   Neurological: He is alert and oriented to person, place, and time. No cranial nerve deficit.   Skin: Skin is warm and dry. No rash noted. He is not diaphoretic. No erythema. No pallor.   Psychiatric: He has a normal mood and affect. His behavior is normal. Thought content normal.   Nursing note and vitals reviewed.      Procedures         ED Course  ED Course                  MDM    Final diagnoses:   Opioid dependence with withdrawal            Mathew Hadley MD  03/26/17 2987

## 2019-06-26 ENCOUNTER — HOSPITAL ENCOUNTER (EMERGENCY)
Facility: HOSPITAL | Age: 44
Discharge: HOME | End: 2019-06-26
Attending: EMERGENCY MEDICINE | Admitting: EMERGENCY MEDICINE

## 2019-06-26 ENCOUNTER — APPOINTMENT (OUTPATIENT)
Dept: ULTRASOUND IMAGING | Facility: HOSPITAL | Age: 44
End: 2019-06-26

## 2019-06-26 VITALS
SYSTOLIC BLOOD PRESSURE: 135 MMHG | BODY MASS INDEX: 30.17 KG/M2 | TEMPERATURE: 98.2 F | OXYGEN SATURATION: 97 % | DIASTOLIC BLOOD PRESSURE: 88 MMHG | RESPIRATION RATE: 16 BRPM | HEIGHT: 67 IN | HEART RATE: 79 BPM | WEIGHT: 192.2 LBS

## 2019-06-26 DIAGNOSIS — I77.6 VASCULITIS (HCC): Primary | ICD-10-CM
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
## 2019-06-26 LAB
ALBUMIN SERPL-MCNC: 4.6 G/DL (ref 3.5–5)
ALBUMIN/GLOB SERPL: 1.1 G/DL (ref 1–2)
ALP SERPL-CCNC: 64 U/L (ref 38–126)
ALT SERPL W P-5'-P-CCNC: 44 U/L (ref 13–69)
AMPHET+METHAMPHET UR QL: POSITIVE
AMPHETAMINES UR QL: POSITIVE
ANION GAP SERPL CALCULATED.3IONS-SCNC: 16.6 MMOL/L (ref 10–20)
APTT PPP: 31.4 SECONDS (ref 24.5–37.2)
AST SERPL-CCNC: 55 U/L (ref 15–46)
BACTERIA UR QL AUTO: ABNORMAL /HPF
BARBITURATES UR QL SCN: NEGATIVE
BASOPHILS # BLD AUTO: 0.02 10*3/MM3 (ref 0–0.2)
BASOPHILS NFR BLD AUTO: 0.3 % (ref 0–1.5)
BENZODIAZ UR QL SCN: POSITIVE
BILIRUB SERPL-MCNC: 0.7 MG/DL (ref 0.2–1.3)
BILIRUB UR QL STRIP: NEGATIVE
BUN BLD-MCNC: 10 MG/DL (ref 7–20)
BUN/CREAT SERPL: 14.3 (ref 6.3–21.9)
BUPRENORPHINE SERPL-MCNC: POSITIVE NG/ML
CALCIUM SPEC-SCNC: 9.4 MG/DL (ref 8.4–10.2)
CANNABINOIDS SERPL QL: NEGATIVE
CHLORIDE SERPL-SCNC: 103 MMOL/L (ref 98–107)
CLARITY UR: ABNORMAL
CO2 SERPL-SCNC: 25 MMOL/L (ref 26–30)
COCAINE UR QL: NEGATIVE
COLOR UR: YELLOW
CREAT BLD-MCNC: 0.7 MG/DL (ref 0.6–1.3)
CRP SERPL-MCNC: 1.3 MG/DL (ref 0–1)
DEPRECATED RDW RBC AUTO: 38.8 FL (ref 37–54)
EOSINOPHIL # BLD AUTO: 0.27 10*3/MM3 (ref 0–0.4)
EOSINOPHIL NFR BLD AUTO: 4.5 % (ref 0.3–6.2)
ERYTHROCYTE [DISTWIDTH] IN BLOOD BY AUTOMATED COUNT: 12.8 % (ref 12.3–15.4)
ERYTHROCYTE [SEDIMENTATION RATE] IN BLOOD: 32 MM/HR (ref 0–15)
GFR SERPL CREATININE-BSD FRML MDRD: 122 ML/MIN/1.73
GLOBULIN UR ELPH-MCNC: 4.1 GM/DL
GLUCOSE BLD-MCNC: 111 MG/DL (ref 74–98)
GLUCOSE UR STRIP-MCNC: NEGATIVE MG/DL
HCT VFR BLD AUTO: 39.2 % (ref 37.5–51)
HGB BLD-MCNC: 13.3 G/DL (ref 13–17.7)
HGB UR QL STRIP.AUTO: NEGATIVE
HYALINE CASTS UR QL AUTO: ABNORMAL /LPF
IMM GRANULOCYTES # BLD AUTO: 0.04 10*3/MM3 (ref 0–0.05)
IMM GRANULOCYTES NFR BLD AUTO: 0.7 % (ref 0–0.5)
INR PPP: 1.08 (ref 0.9–1.1)
KETONES UR QL STRIP: NEGATIVE
LEUKOCYTE ESTERASE UR QL STRIP.AUTO: NEGATIVE
LYMPHOCYTES # BLD AUTO: 1.68 10*3/MM3 (ref 0.7–3.1)
LYMPHOCYTES NFR BLD AUTO: 27.7 % (ref 19.6–45.3)
MCH RBC QN AUTO: 28.5 PG (ref 26.6–33)
MCHC RBC AUTO-ENTMCNC: 33.9 G/DL (ref 31.5–35.7)
MCV RBC AUTO: 84.1 FL (ref 79–97)
METHADONE UR QL SCN: NEGATIVE
MONOCYTES # BLD AUTO: 0.46 10*3/MM3 (ref 0.1–0.9)
MONOCYTES NFR BLD AUTO: 7.6 % (ref 5–12)
MUCOUS THREADS URNS QL MICRO: ABNORMAL /HPF
NEUTROPHILS # BLD AUTO: 3.59 10*3/MM3 (ref 1.7–7)
NEUTROPHILS NFR BLD AUTO: 59.2 % (ref 42.7–76)
NITRITE UR QL STRIP: NEGATIVE
NRBC BLD AUTO-RTO: 0 /100 WBC (ref 0–0.2)
NT-PROBNP SERPL-MCNC: 120 PG/ML (ref 0–125)
OPIATES UR QL: NEGATIVE
OXYCODONE UR QL SCN: POSITIVE
PCP UR QL SCN: NEGATIVE
PH UR STRIP.AUTO: 6.5 [PH] (ref 5–8)
PLATELET # BLD AUTO: 152 10*3/MM3 (ref 140–450)
PMV BLD AUTO: 7.9 FL (ref 6–12)
POTASSIUM BLD-SCNC: 4.6 MMOL/L (ref 3.5–5.1)
PROPOXYPH UR QL: NEGATIVE
PROT SERPL-MCNC: 8.7 G/DL (ref 6.3–8.2)
PROT UR QL STRIP: ABNORMAL
PROTHROMBIN TIME: 14.3 SECONDS (ref 12–15.1)
RBC # BLD AUTO: 4.66 10*6/MM3 (ref 4.14–5.8)
RBC # UR: ABNORMAL /HPF
REF LAB TEST METHOD: ABNORMAL
SODIUM BLD-SCNC: 140 MMOL/L (ref 137–145)
SP GR UR STRIP: 1.02 (ref 1–1.03)
SPERM URNS QL MICRO: ABNORMAL /HPF
SQUAMOUS #/AREA URNS HPF: ABNORMAL /HPF
TRICYCLICS UR QL SCN: NEGATIVE
UROBILINOGEN UR QL STRIP: ABNORMAL
WBC NRBC COR # BLD: 6.06 10*3/MM3 (ref 3.4–10.8)
WBC UR QL AUTO: ABNORMAL /HPF

## 2019-06-26 PROCEDURE — 85025 COMPLETE CBC W/AUTO DIFF WBC: CPT | Performed by: PHYSICIAN ASSISTANT

## 2019-06-26 PROCEDURE — 80053 COMPREHEN METABOLIC PANEL: CPT | Performed by: PHYSICIAN ASSISTANT

## 2019-06-26 PROCEDURE — 85610 PROTHROMBIN TIME: CPT | Performed by: PHYSICIAN ASSISTANT

## 2019-06-26 PROCEDURE — 86140 C-REACTIVE PROTEIN: CPT | Performed by: PHYSICIAN ASSISTANT

## 2019-06-26 PROCEDURE — 80074 ACUTE HEPATITIS PANEL: CPT | Performed by: PHYSICIAN ASSISTANT

## 2019-06-26 PROCEDURE — 81001 URINALYSIS AUTO W/SCOPE: CPT | Performed by: PHYSICIAN ASSISTANT

## 2019-06-26 PROCEDURE — 99283 EMERGENCY DEPT VISIT LOW MDM: CPT

## 2019-06-26 PROCEDURE — 93970 EXTREMITY STUDY: CPT

## 2019-06-26 PROCEDURE — 85651 RBC SED RATE NONAUTOMATED: CPT | Performed by: PHYSICIAN ASSISTANT

## 2019-06-26 PROCEDURE — 83880 ASSAY OF NATRIURETIC PEPTIDE: CPT | Performed by: PHYSICIAN ASSISTANT

## 2019-06-26 PROCEDURE — 85730 THROMBOPLASTIN TIME PARTIAL: CPT | Performed by: PHYSICIAN ASSISTANT

## 2019-06-26 PROCEDURE — 80306 DRUG TEST PRSMV INSTRMNT: CPT | Performed by: PHYSICIAN ASSISTANT

## 2019-06-26 RX ORDER — SODIUM CHLORIDE 0.9 % (FLUSH) 0.9 %
10 SYRINGE (ML) INJECTION AS NEEDED
Status: DISCONTINUED | OUTPATIENT
Start: 2019-06-26 | End: 2019-06-26 | Stop reason: HOSPADM

## 2019-06-26 RX ORDER — INDOMETHACIN 50 MG/1
50 CAPSULE ORAL
Qty: 15 CAPSULE | Refills: 0 | Status: SHIPPED | OUTPATIENT
Start: 2019-06-26

## 2019-06-26 RX ORDER — PREDNISONE 20 MG/1
20 TABLET ORAL 3 TIMES DAILY
Qty: 15 TABLET | Refills: 0 | Status: SHIPPED | OUTPATIENT
Start: 2019-06-26 | End: 2019-07-01

## 2019-06-26 RX ORDER — DOXYCYCLINE 100 MG/1
100 CAPSULE ORAL 2 TIMES DAILY
Qty: 20 CAPSULE | Refills: 0 | Status: SHIPPED | OUTPATIENT
Start: 2019-06-26

## 2019-06-26 RX ADMIN — SODIUM CHLORIDE 1000 ML: 9 INJECTION, SOLUTION INTRAVENOUS at 13:15

## 2019-06-27 ENCOUNTER — TELEPHONE (OUTPATIENT)
Dept: EMERGENCY DEPT | Facility: HOSPITAL | Age: 44
End: 2019-06-27

## 2019-06-27 LAB
HAV IGM SERPL QL IA: NEGATIVE
HBV CORE IGM SERPL QL IA: NEGATIVE
HBV SURFACE AG SERPL QL IA: NEGATIVE
HCV AB S/CO SERPL IA: >11 S/CO RATIO (ref 0–0.9)

## 2019-07-08 ENCOUNTER — HOSPITAL ENCOUNTER (EMERGENCY)
Facility: HOSPITAL | Age: 44
Discharge: HOME | End: 2019-07-09
Attending: EMERGENCY MEDICINE

## 2019-07-08 VITALS
RESPIRATION RATE: 18 BRPM | HEART RATE: 80 BPM | WEIGHT: 189 LBS | DIASTOLIC BLOOD PRESSURE: 90 MMHG | BODY MASS INDEX: 34.78 KG/M2 | TEMPERATURE: 97.4 F | OXYGEN SATURATION: 98 % | SYSTOLIC BLOOD PRESSURE: 162 MMHG | HEIGHT: 62 IN

## 2019-07-08 PROCEDURE — 99211 OFF/OP EST MAY X REQ PHY/QHP: CPT
